# Patient Record
Sex: FEMALE | Race: WHITE | NOT HISPANIC OR LATINO
[De-identification: names, ages, dates, MRNs, and addresses within clinical notes are randomized per-mention and may not be internally consistent; named-entity substitution may affect disease eponyms.]

---

## 2017-05-12 PROBLEM — Z00.00 ENCOUNTER FOR PREVENTIVE HEALTH EXAMINATION: Status: ACTIVE | Noted: 2017-05-12

## 2017-05-26 ENCOUNTER — APPOINTMENT (OUTPATIENT)
Dept: ORTHOPEDIC SURGERY | Facility: CLINIC | Age: 62
End: 2017-05-26

## 2017-05-26 VITALS
BODY MASS INDEX: 29.37 KG/M2 | WEIGHT: 172 LBS | HEIGHT: 64 IN | HEART RATE: 62 BPM | SYSTOLIC BLOOD PRESSURE: 119 MMHG | DIASTOLIC BLOOD PRESSURE: 73 MMHG

## 2017-05-26 DIAGNOSIS — Z82.61 FAMILY HISTORY OF ARTHRITIS: ICD-10-CM

## 2017-05-26 DIAGNOSIS — Z78.9 OTHER SPECIFIED HEALTH STATUS: ICD-10-CM

## 2017-05-26 DIAGNOSIS — Z96.642 PRESENCE OF LEFT ARTIFICIAL HIP JOINT: ICD-10-CM

## 2017-05-26 DIAGNOSIS — Z87.39 PERSONAL HISTORY OF OTHER DISEASES OF THE MUSCULOSKELETAL SYSTEM AND CONNECTIVE TISSUE: ICD-10-CM

## 2017-05-26 DIAGNOSIS — Z80.9 FAMILY HISTORY OF MALIGNANT NEOPLASM, UNSPECIFIED: ICD-10-CM

## 2017-05-26 RX ORDER — ALPRAZOLAM 0.5 MG/1
0.5 TABLET ORAL
Qty: 90 | Refills: 0 | Status: ACTIVE | COMMUNITY
Start: 2017-01-28

## 2017-05-26 RX ORDER — UBIDECARENONE/VIT E ACET 100MG-5
CAPSULE ORAL
Refills: 0 | Status: ACTIVE | COMMUNITY

## 2017-05-26 RX ORDER — AMOXICILLIN 875 MG/1
875 TABLET, FILM COATED ORAL
Qty: 14 | Refills: 0 | Status: ACTIVE | COMMUNITY
Start: 2016-11-29

## 2017-05-26 RX ORDER — LOSARTAN POTASSIUM 100 MG/1
TABLET, FILM COATED ORAL
Refills: 0 | Status: ACTIVE | COMMUNITY

## 2017-05-26 RX ORDER — ALPRAZOLAM 2 MG/1
TABLET ORAL
Refills: 0 | Status: ACTIVE | COMMUNITY

## 2017-05-26 RX ORDER — LOSARTAN POTASSIUM 25 MG/1
25 TABLET, FILM COATED ORAL
Qty: 30 | Refills: 0 | Status: ACTIVE | COMMUNITY
Start: 2017-01-28

## 2017-05-26 RX ORDER — PREDNISONE 10 MG/1
10 TABLET ORAL
Qty: 42 | Refills: 0 | Status: ACTIVE | COMMUNITY
Start: 2017-02-13

## 2017-05-26 RX ORDER — LOSARTAN POTASSIUM 50 MG/1
50 TABLET, FILM COATED ORAL
Qty: 30 | Refills: 0 | Status: ACTIVE | COMMUNITY
Start: 2017-04-22

## 2017-05-30 ENCOUNTER — EMERGENCY (EMERGENCY)
Facility: HOSPITAL | Age: 62
LOS: 1 days | Discharge: HOME | End: 2017-05-30
Attending: EMERGENCY MEDICINE | Admitting: EMERGENCY MEDICINE
Payer: COMMERCIAL

## 2017-05-30 ENCOUNTER — CHART COPY (OUTPATIENT)
Age: 62
End: 2017-05-30

## 2017-05-30 VITALS
TEMPERATURE: 98 F | HEART RATE: 70 BPM | RESPIRATION RATE: 18 BRPM | OXYGEN SATURATION: 100 % | DIASTOLIC BLOOD PRESSURE: 99 MMHG | SYSTOLIC BLOOD PRESSURE: 176 MMHG

## 2017-05-30 DIAGNOSIS — M16.11 UNILATERAL PRIMARY OSTEOARTHRITIS, RIGHT HIP: ICD-10-CM

## 2017-05-30 PROCEDURE — 99285 EMERGENCY DEPT VISIT HI MDM: CPT | Mod: 25

## 2017-05-30 RX ORDER — DICLOFENAC SODIUM 75 MG/1
75 TABLET, DELAYED RELEASE ORAL
Qty: 60 | Refills: 0 | Status: ACTIVE | COMMUNITY
Start: 2017-05-30 | End: 1900-01-01

## 2017-05-30 NOTE — ED ADULT TRIAGE NOTE - CHIEF COMPLAINT QUOTE
Pt c/o right thigh pain since last night. Usually has muscle spasms on both legs but never pain that does not subside. Denies any long trips recently, SOB, swelling or redness at the site. Pain began after she was running errands all day but denies any trauma or fall. PMH of b/l total hip replacement, anxiety, HTN.

## 2017-05-30 NOTE — ED ADULT NURSE NOTE - OBJECTIVE STATEMENT
Pt A+OX4 c/o R thigh pain which began last night and took tylenol without relief.  Came to ER because pain became unbearable.  States Hx of rodney hip replacements and R hip needs revision.  States she was to go for blood work as per MD to determine if metal has migrated into blood.  Rx given for CBCD and Nickel testing.  Able to bear weight and walk but with extreme pain.  Labs obtained and sent as ordered.  #20g SL L arm placed.  Kept NPO.  Elevated BP, Hx HTN, MD notified

## 2017-05-31 VITALS
OXYGEN SATURATION: 98 % | SYSTOLIC BLOOD PRESSURE: 175 MMHG | HEART RATE: 57 BPM | DIASTOLIC BLOOD PRESSURE: 81 MMHG | RESPIRATION RATE: 15 BRPM

## 2017-05-31 DIAGNOSIS — Z96.643 PRESENCE OF ARTIFICIAL HIP JOINT, BILATERAL: Chronic | ICD-10-CM

## 2017-05-31 LAB
ALBUMIN SERPL ELPH-MCNC: 4.8 G/DL — SIGNIFICANT CHANGE UP (ref 3.3–5)
ALP SERPL-CCNC: 49 U/L — SIGNIFICANT CHANGE UP (ref 40–120)
ALT FLD-CCNC: 21 U/L — SIGNIFICANT CHANGE UP (ref 4–33)
AST SERPL-CCNC: 20 U/L — SIGNIFICANT CHANGE UP (ref 4–32)
BASE EXCESS BLDV CALC-SCNC: 1.7 MMOL/L — SIGNIFICANT CHANGE UP
BASOPHILS # BLD AUTO: 0.01 K/UL — SIGNIFICANT CHANGE UP (ref 0–0.2)
BASOPHILS NFR BLD AUTO: 0.1 % — SIGNIFICANT CHANGE UP (ref 0–2)
BILIRUB SERPL-MCNC: 0.2 MG/DL — SIGNIFICANT CHANGE UP (ref 0.2–1.2)
BLOOD GAS VENOUS - CREATININE: 0.67 MG/DL — SIGNIFICANT CHANGE UP (ref 0.5–1.3)
BUN SERPL-MCNC: 12 MG/DL — SIGNIFICANT CHANGE UP (ref 7–23)
CALCIUM SERPL-MCNC: 9.9 MG/DL — SIGNIFICANT CHANGE UP (ref 8.4–10.5)
CHLORIDE BLDV-SCNC: 110 MMOL/L — HIGH (ref 96–108)
CHLORIDE SERPL-SCNC: 102 MMOL/L — SIGNIFICANT CHANGE UP (ref 98–107)
CO2 SERPL-SCNC: 24 MMOL/L — SIGNIFICANT CHANGE UP (ref 22–31)
CREAT SERPL-MCNC: 0.72 MG/DL — SIGNIFICANT CHANGE UP (ref 0.5–1.3)
EOSINOPHIL # BLD AUTO: 0.13 K/UL — SIGNIFICANT CHANGE UP (ref 0–0.5)
EOSINOPHIL NFR BLD AUTO: 1.7 % — SIGNIFICANT CHANGE UP (ref 0–6)
GAS PNL BLDV: 138 MMOL/L — SIGNIFICANT CHANGE UP (ref 136–146)
GLUCOSE BLDV-MCNC: 107 — HIGH (ref 70–99)
GLUCOSE SERPL-MCNC: 104 MG/DL — HIGH (ref 70–99)
HCO3 BLDV-SCNC: 24 MMOL/L — SIGNIFICANT CHANGE UP (ref 20–27)
HCT VFR BLD CALC: 40.8 % — SIGNIFICANT CHANGE UP (ref 34.5–45)
HCT VFR BLDV CALC: 46.9 % — HIGH (ref 34.5–45)
HGB BLD-MCNC: 13.5 G/DL — SIGNIFICANT CHANGE UP (ref 11.5–15.5)
HGB BLDV-MCNC: 15.3 G/DL — SIGNIFICANT CHANGE UP (ref 11.5–15.5)
IMM GRANULOCYTES NFR BLD AUTO: 0.1 % — SIGNIFICANT CHANGE UP (ref 0–1.5)
LACTATE BLDV-MCNC: 1.8 MMOL/L — SIGNIFICANT CHANGE UP (ref 0.5–2)
LYMPHOCYTES # BLD AUTO: 1.83 K/UL — SIGNIFICANT CHANGE UP (ref 1–3.3)
LYMPHOCYTES # BLD AUTO: 23.9 % — SIGNIFICANT CHANGE UP (ref 13–44)
MCHC RBC-ENTMCNC: 29.2 PG — SIGNIFICANT CHANGE UP (ref 27–34)
MCHC RBC-ENTMCNC: 33.1 % — SIGNIFICANT CHANGE UP (ref 32–36)
MCV RBC AUTO: 88.1 FL — SIGNIFICANT CHANGE UP (ref 80–100)
MONOCYTES # BLD AUTO: 0.12 K/UL — SIGNIFICANT CHANGE UP (ref 0–0.9)
MONOCYTES NFR BLD AUTO: 1.6 % — LOW (ref 2–14)
NEUTROPHILS # BLD AUTO: 5.55 K/UL — SIGNIFICANT CHANGE UP (ref 1.8–7.4)
NEUTROPHILS NFR BLD AUTO: 72.6 % — SIGNIFICANT CHANGE UP (ref 43–77)
OB PNL STL: POSITIVE — SIGNIFICANT CHANGE UP
PCO2 BLDV: 50 MMHG — SIGNIFICANT CHANGE UP (ref 41–51)
PH BLDV: 7.35 PH — SIGNIFICANT CHANGE UP (ref 7.32–7.43)
PLATELET # BLD AUTO: 342 K/UL — SIGNIFICANT CHANGE UP (ref 150–400)
PMV BLD: 9.9 FL — SIGNIFICANT CHANGE UP (ref 7–13)
PO2 BLDV: 38 MMHG — SIGNIFICANT CHANGE UP (ref 35–40)
POTASSIUM BLDV-SCNC: 3.8 MMOL/L — SIGNIFICANT CHANGE UP (ref 3.4–4.5)
POTASSIUM SERPL-MCNC: 4.1 MMOL/L — SIGNIFICANT CHANGE UP (ref 3.5–5.3)
POTASSIUM SERPL-SCNC: 4.1 MMOL/L — SIGNIFICANT CHANGE UP (ref 3.5–5.3)
PROT SERPL-MCNC: 8.1 G/DL — SIGNIFICANT CHANGE UP (ref 6–8.3)
RBC # BLD: 4.63 M/UL — SIGNIFICANT CHANGE UP (ref 3.8–5.2)
RBC # FLD: 13.1 % — SIGNIFICANT CHANGE UP (ref 10.3–14.5)
SAO2 % BLDV: 64.7 % — SIGNIFICANT CHANGE UP (ref 60–85)
SODIUM SERPL-SCNC: 142 MMOL/L — SIGNIFICANT CHANGE UP (ref 135–145)
WBC # BLD: 7.65 K/UL — SIGNIFICANT CHANGE UP (ref 3.8–10.5)
WBC # FLD AUTO: 7.65 K/UL — SIGNIFICANT CHANGE UP (ref 3.8–10.5)

## 2017-05-31 RX ORDER — KETOROLAC TROMETHAMINE 30 MG/ML
15 SYRINGE (ML) INJECTION ONCE
Qty: 0 | Refills: 0 | Status: DISCONTINUED | OUTPATIENT
Start: 2017-05-31 | End: 2017-05-31

## 2017-05-31 RX ORDER — ACETAMINOPHEN 500 MG
1000 TABLET ORAL ONCE
Qty: 0 | Refills: 0 | Status: COMPLETED | OUTPATIENT
Start: 2017-05-31 | End: 2017-05-31

## 2017-05-31 RX ADMIN — Medication 15 MILLIGRAM(S): at 00:53

## 2017-05-31 RX ADMIN — Medication 15 MILLIGRAM(S): at 00:38

## 2017-05-31 RX ADMIN — Medication 400 MILLIGRAM(S): at 01:21

## 2017-05-31 RX ADMIN — Medication 30 MILLILITER(S): at 02:43

## 2017-05-31 NOTE — ED PROVIDER NOTE - TIMING
intermittent/sudden onset/intermittent worsening (today s/p performing activities) right anterior thigh pain/worsening

## 2017-05-31 NOTE — ED PROVIDER NOTE - OBJECTIVE STATEMENT
60 y/o F pt with no sig PMHx and PSHx of b/l Hip replacement (left hip was 8 years ago and right hip was 20 years ago) arrives to the ED c/o intermittent worsening (today s/p performing activities) right anterior thigh pain for 3 months and one bloody stool episode earlier today. Pt reports that she normally has muscle spasms to both legs but her Orthopedic recently noticed that her b/l hips needed revision; XR then done last week to confirm this. Tylenol for minimal relief. Denies fever, chills, N/V/D, SOB, CP, numbness, weakness, hip pain, knee pain or any other complaints. Allergic to Morphine.

## 2017-05-31 NOTE — ED PROVIDER NOTE - CHPI ED SYMPTOMS POS
PAIN/intermittent worsening (today s/p performing activities) right anterior thigh pain and one bloody stool episode

## 2017-05-31 NOTE — ED PROVIDER NOTE - MEDICAL DECISION MAKING DETAILS
60 y/o F pt with right anterior thigh pain. Likely Musculoskeletal. With recent XR, will obtain labs, muscles relaxants, and anti-inflammatories. Reassess.

## 2017-05-31 NOTE — ED PROVIDER NOTE - NS ED MD SCRIBE ATTENDING SCRIBE SECTIONS
PAST MEDICAL/SURGICAL/SOCIAL HISTORY/PHYSICAL EXAM/REVIEW OF SYSTEMS/VITAL SIGNS( Pullset)/HISTORY OF PRESENT ILLNESS/DISPOSITION/HIV

## 2017-05-31 NOTE — ED ADULT NURSE REASSESSMENT NOTE - NS ED NURSE REASSESS COMMENT FT1
Patient is complaining of 10/10 pain with no relief from previous meds.  Vitals taken, pain meds given per MD orders and will reassess pain.

## 2017-05-31 NOTE — ED PROVIDER NOTE - PROGRESS NOTE DETAILS
Pt states feeling better. Ambulating without any difficulty. Discussed lab results and given GI f/u. Discussed f/u with Ortho as planned. Pt was prescribed Diclofenac which she has not yet started. Sent Percocet to pharmacy which helped here and no GI upset when taken with Maalox prior.

## 2017-05-31 NOTE — ED PROVIDER NOTE - MUSCULOSKELETAL MINIMAL EXAM
normal range of motion/no hip TTP, no knee (b/l) TTP, no hip swelling, and no calf swelling  and positive distal pulses. Also no midline TTP/no muscle tenderness/neck supple/atraumatic

## 2017-06-01 ENCOUNTER — CHART COPY (OUTPATIENT)
Age: 62
End: 2017-06-01

## 2017-06-02 ENCOUNTER — APPOINTMENT (OUTPATIENT)
Dept: ORTHOPEDIC SURGERY | Facility: CLINIC | Age: 62
End: 2017-06-02

## 2017-06-02 VITALS
DIASTOLIC BLOOD PRESSURE: 78 MMHG | SYSTOLIC BLOOD PRESSURE: 168 MMHG | WEIGHT: 172 LBS | HEIGHT: 64 IN | BODY MASS INDEX: 29.37 KG/M2 | HEART RATE: 62 BPM

## 2017-06-02 DIAGNOSIS — M25.551 PAIN IN RIGHT HIP: ICD-10-CM

## 2017-06-02 RX ORDER — OXYCODONE AND ACETAMINOPHEN 5; 325 MG/1; MG/1
5-325 TABLET ORAL
Qty: 20 | Refills: 0 | Status: ACTIVE | COMMUNITY
Start: 2017-06-02 | End: 1900-01-01

## 2017-06-02 RX ORDER — FAMOTIDINE 20 MG/1
20 TABLET, FILM COATED ORAL
Qty: 14 | Refills: 0 | Status: ACTIVE | COMMUNITY
Start: 2017-06-02 | End: 1900-01-01

## 2018-01-23 ENCOUNTER — OUTPATIENT (OUTPATIENT)
Dept: OUTPATIENT SERVICES | Facility: HOSPITAL | Age: 63
LOS: 1 days | End: 2018-01-23
Payer: COMMERCIAL

## 2018-01-23 VITALS
RESPIRATION RATE: 16 BRPM | HEIGHT: 64 IN | HEART RATE: 65 BPM | SYSTOLIC BLOOD PRESSURE: 131 MMHG | OXYGEN SATURATION: 98 % | DIASTOLIC BLOOD PRESSURE: 79 MMHG | TEMPERATURE: 98 F | WEIGHT: 175.05 LBS

## 2018-01-23 DIAGNOSIS — S43.421A SPRAIN OF RIGHT ROTATOR CUFF CAPSULE, INITIAL ENCOUNTER: ICD-10-CM

## 2018-01-23 DIAGNOSIS — Z96.643 PRESENCE OF ARTIFICIAL HIP JOINT, BILATERAL: Chronic | ICD-10-CM

## 2018-01-23 DIAGNOSIS — M75.01 ADHESIVE CAPSULITIS OF RIGHT SHOULDER: ICD-10-CM

## 2018-01-23 DIAGNOSIS — M75.21 BICIPITAL TENDINITIS, RIGHT SHOULDER: ICD-10-CM

## 2018-01-23 DIAGNOSIS — M75.51 BURSITIS OF RIGHT SHOULDER: ICD-10-CM

## 2018-01-23 DIAGNOSIS — F41.0 PANIC DISORDER [EPISODIC PAROXYSMAL ANXIETY]: ICD-10-CM

## 2018-01-23 DIAGNOSIS — Z01.818 ENCOUNTER FOR OTHER PREPROCEDURAL EXAMINATION: ICD-10-CM

## 2018-01-23 DIAGNOSIS — I10 ESSENTIAL (PRIMARY) HYPERTENSION: ICD-10-CM

## 2018-01-23 DIAGNOSIS — Z98.890 OTHER SPECIFIED POSTPROCEDURAL STATES: Chronic | ICD-10-CM

## 2018-01-23 LAB
ANION GAP SERPL CALC-SCNC: 19 MMOL/L — HIGH (ref 5–17)
BUN SERPL-MCNC: 16 MG/DL — SIGNIFICANT CHANGE UP (ref 7–23)
CALCIUM SERPL-MCNC: 10.5 MG/DL — SIGNIFICANT CHANGE UP (ref 8.4–10.5)
CHLORIDE SERPL-SCNC: 100 MMOL/L — SIGNIFICANT CHANGE UP (ref 96–108)
CO2 SERPL-SCNC: 24 MMOL/L — SIGNIFICANT CHANGE UP (ref 22–31)
CREAT SERPL-MCNC: 0.74 MG/DL — SIGNIFICANT CHANGE UP (ref 0.5–1.3)
GLUCOSE SERPL-MCNC: 97 MG/DL — SIGNIFICANT CHANGE UP (ref 70–99)
HCT VFR BLD CALC: 40.6 % — SIGNIFICANT CHANGE UP (ref 34.5–45)
HGB BLD-MCNC: 13.3 G/DL — SIGNIFICANT CHANGE UP (ref 11.5–15.5)
MCHC RBC-ENTMCNC: 28.6 PG — SIGNIFICANT CHANGE UP (ref 27–34)
MCHC RBC-ENTMCNC: 32.8 GM/DL — SIGNIFICANT CHANGE UP (ref 32–36)
MCV RBC AUTO: 87.3 FL — SIGNIFICANT CHANGE UP (ref 80–100)
PLATELET # BLD AUTO: 407 K/UL — HIGH (ref 150–400)
POTASSIUM SERPL-MCNC: 4.3 MMOL/L — SIGNIFICANT CHANGE UP (ref 3.5–5.3)
POTASSIUM SERPL-SCNC: 4.3 MMOL/L — SIGNIFICANT CHANGE UP (ref 3.5–5.3)
RBC # BLD: 4.65 M/UL — SIGNIFICANT CHANGE UP (ref 3.8–5.2)
RBC # FLD: 13.6 % — SIGNIFICANT CHANGE UP (ref 10.3–14.5)
SODIUM SERPL-SCNC: 143 MMOL/L — SIGNIFICANT CHANGE UP (ref 135–145)
WBC # BLD: 6.11 K/UL — SIGNIFICANT CHANGE UP (ref 3.8–10.5)
WBC # FLD AUTO: 6.11 K/UL — SIGNIFICANT CHANGE UP (ref 3.8–10.5)

## 2018-01-23 PROCEDURE — 85027 COMPLETE CBC AUTOMATED: CPT

## 2018-01-23 PROCEDURE — 80048 BASIC METABOLIC PNL TOTAL CA: CPT

## 2018-01-23 PROCEDURE — G0463: CPT

## 2018-01-23 RX ORDER — APREPITANT 80 MG/1
40 CAPSULE ORAL ONCE
Qty: 0 | Refills: 0 | Status: COMPLETED | OUTPATIENT
Start: 2018-01-29 | End: 2018-01-29

## 2018-01-23 RX ORDER — LIDOCAINE HCL 20 MG/ML
0.2 VIAL (ML) INJECTION ONCE
Qty: 0 | Refills: 0 | Status: DISCONTINUED | OUTPATIENT
Start: 2018-01-29 | End: 2018-02-13

## 2018-01-23 RX ORDER — SODIUM CHLORIDE 9 MG/ML
3 INJECTION INTRAMUSCULAR; INTRAVENOUS; SUBCUTANEOUS EVERY 8 HOURS
Qty: 0 | Refills: 0 | Status: DISCONTINUED | OUTPATIENT
Start: 2018-01-29 | End: 2018-02-13

## 2018-01-23 RX ORDER — CEFAZOLIN SODIUM 1 G
2000 VIAL (EA) INJECTION ONCE
Qty: 0 | Refills: 0 | Status: DISCONTINUED | OUTPATIENT
Start: 2018-01-29 | End: 2018-02-13

## 2018-01-23 NOTE — H&P PST ADULT - HISTORY OF PRESENT ILLNESS
63 y/o female for 63 y/o female with history of hypertension panic disorder and osteoarthrosis with hip replacement bilateral . Patient came in for worsening shouldwer pain that gotten worst recently. patient was referred to dr Castillo , evaluated and was scheduled this procedure for surgical intervention. 61 y/o female with history of hypertension panic disorder and osteoarthrosis with hip replacement bilateral . Patient came in for PST  for right shoulder rotator cuff repair , Patient has history of worsening shoulder pain that gotten worst recently.  Patient  was referred to Dr Castillo , evaluated and was scheduled this procedure for surgical intervention.

## 2018-01-23 NOTE — H&P PST ADULT - PMH
Hypertension  on meds  Osteoarthrosis  s/p bilateral hip replacements  Panic attacks  since age 40 y/o on xanax

## 2018-01-23 NOTE — H&P PST ADULT - GENERAL COMMENTS
c/o shoulder pains worst x 1 month torned rotator cuff c/o shoulder pains worst x 1 month states has  torn rotator cuff

## 2018-01-23 NOTE — H&P PST ADULT - PROBLEM SELECTOR PLAN 1
right shoulder rotator cuff repair   right shoulder debridement right subacromial  decompression right open subpectoral bicep tendesis   PSt instruction given with Pepcid 1 tablet x 2 days preop and antibacterial soap x 3 days preop for prophylaxis for prevention of infection post op   CBC/BMP drawn sent pending result    seen by PMD yesterday and will send preop medical evaluation  preop right shoulder rotator cuff repair   right shoulder debridement right subacromial  decompression right open subpectoral bicep tendesis   PSt instruction given with Pepcid 1 tablet x 2 days preop and antibacterial soap x 3 days preop for prophylaxis for prevention of infection post op   CBC/BMP drawn sent pending result    seen by PMD  Dr Corral yesterday and will send preop medical evaluation  preop Hx of Torn rotator cuff   Plan: right shoulder rotator cuff repair   right shoulder debridement right subacromial  decompression right open subpectoral bicep tendesis   PSt instruction given with Pepcid 1 tablet x 2 days preop and antibacterial soap x 3 days preop for prophylaxis for prevention of infection post op   CBC/BMP drawn sent pending result    seen by PMD  Dr Corral yesterday and will send preop medical evaluation  preop

## 2018-01-23 NOTE — H&P PST ADULT - PROBLEM SELECTOR PLAN 3
to continue taking amlodipine and Hydrochlorothiazide,  regularly as prescribed and to hold water pill on DOS

## 2018-01-29 ENCOUNTER — OUTPATIENT (OUTPATIENT)
Dept: OUTPATIENT SERVICES | Facility: HOSPITAL | Age: 63
LOS: 1 days | End: 2018-01-29
Payer: COMMERCIAL

## 2018-01-29 ENCOUNTER — TRANSCRIPTION ENCOUNTER (OUTPATIENT)
Age: 63
End: 2018-01-29

## 2018-01-29 VITALS
OXYGEN SATURATION: 97 % | TEMPERATURE: 98 F | SYSTOLIC BLOOD PRESSURE: 120 MMHG | HEART RATE: 65 BPM | DIASTOLIC BLOOD PRESSURE: 55 MMHG | RESPIRATION RATE: 16 BRPM

## 2018-01-29 VITALS
HEIGHT: 64 IN | HEART RATE: 65 BPM | TEMPERATURE: 98 F | RESPIRATION RATE: 16 BRPM | OXYGEN SATURATION: 98 % | SYSTOLIC BLOOD PRESSURE: 131 MMHG | DIASTOLIC BLOOD PRESSURE: 79 MMHG | WEIGHT: 175.05 LBS

## 2018-01-29 DIAGNOSIS — M75.51 BURSITIS OF RIGHT SHOULDER: ICD-10-CM

## 2018-01-29 DIAGNOSIS — S43.421A SPRAIN OF RIGHT ROTATOR CUFF CAPSULE, INITIAL ENCOUNTER: ICD-10-CM

## 2018-01-29 DIAGNOSIS — M75.01 ADHESIVE CAPSULITIS OF RIGHT SHOULDER: ICD-10-CM

## 2018-01-29 DIAGNOSIS — Z96.643 PRESENCE OF ARTIFICIAL HIP JOINT, BILATERAL: Chronic | ICD-10-CM

## 2018-01-29 DIAGNOSIS — M75.21 BICIPITAL TENDINITIS, RIGHT SHOULDER: ICD-10-CM

## 2018-01-29 DIAGNOSIS — Z98.890 OTHER SPECIFIED POSTPROCEDURAL STATES: Chronic | ICD-10-CM

## 2018-01-29 PROCEDURE — 29826 SHO ARTHRS SRG DECOMPRESSION: CPT | Mod: RT

## 2018-01-29 PROCEDURE — 23430 REPAIR BICEPS TENDON: CPT | Mod: RT

## 2018-01-29 PROCEDURE — C1713: CPT

## 2018-01-29 PROCEDURE — 29827 SHO ARTHRS SRG RT8TR CUF RPR: CPT | Mod: RT

## 2018-01-29 RX ORDER — ONDANSETRON 8 MG/1
4 TABLET, FILM COATED ORAL ONCE
Qty: 0 | Refills: 0 | Status: DISCONTINUED | OUTPATIENT
Start: 2018-01-29 | End: 2018-02-13

## 2018-01-29 RX ORDER — HYDROMORPHONE HYDROCHLORIDE 2 MG/ML
0.25 INJECTION INTRAMUSCULAR; INTRAVENOUS; SUBCUTANEOUS
Qty: 0 | Refills: 0 | Status: DISCONTINUED | OUTPATIENT
Start: 2018-01-29 | End: 2018-01-29

## 2018-01-29 RX ORDER — ALPRAZOLAM 0.25 MG
0 TABLET ORAL
Qty: 0 | Refills: 0 | COMMUNITY

## 2018-01-29 RX ORDER — OXYCODONE HYDROCHLORIDE 5 MG/1
5 TABLET ORAL ONCE
Qty: 0 | Refills: 0 | Status: DISCONTINUED | OUTPATIENT
Start: 2018-01-29 | End: 2018-01-29

## 2018-01-29 RX ORDER — CELECOXIB 200 MG/1
200 CAPSULE ORAL ONCE
Qty: 0 | Refills: 0 | Status: DISCONTINUED | OUTPATIENT
Start: 2018-01-29 | End: 2018-02-13

## 2018-01-29 RX ORDER — SODIUM CHLORIDE 9 MG/ML
1000 INJECTION, SOLUTION INTRAVENOUS
Qty: 0 | Refills: 0 | Status: DISCONTINUED | OUTPATIENT
Start: 2018-01-29 | End: 2018-02-13

## 2018-01-29 RX ORDER — AMLODIPINE BESYLATE 2.5 MG/1
1 TABLET ORAL
Qty: 0 | Refills: 0 | COMMUNITY

## 2018-01-29 RX ADMIN — APREPITANT 40 MILLIGRAM(S): 80 CAPSULE ORAL at 10:30

## 2018-01-29 NOTE — ASU PATIENT PROFILE, ADULT - PMH
Hypertension  on meds  Osteoarthrosis  s/p bilateral hip replacements  Panic attacks  since age 38 y/o on xanax

## 2018-01-29 NOTE — PRE-ANESTHESIA EVALUATION ADULT - NSPROPOSEDPROCEDFT_GEN_ALL_CORE
right shoulder rotator cuff repair, right shoulder debridment, right subacromial debridment, right subacromial decompression, right open subpectoral biceps tendesis

## 2018-01-29 NOTE — PRE-ANESTHESIA EVALUATION ADULT - NSPREOPDXFT_GEN_ALL_CORE
sprain of right rotator cuff, adhesive capsulitis right shoulder bursitis right shoulder bicipital tendinitis right shoulder

## 2018-01-29 NOTE — ASU DISCHARGE PLAN (ADULT/PEDIATRIC). - NOTIFY
Pain not relieved by Medications/Numbness, color, or temperature change to extremity/Persistent Nausea and Vomiting/Swelling that continues/Bleeding that does not stop Persistent Nausea and Vomiting/Numbness, color, or temperature change to extremity/Bleeding that does not stop/Swelling that continues/Inability to Tolerate Liquids or Foods/Pain not relieved by Medications/Unable to Urinate

## 2018-01-29 NOTE — ASU DISCHARGE PLAN (ADULT/PEDIATRIC). - MEDICATION SUMMARY - MEDICATIONS TO TAKE
I will START or STAY ON the medications listed below when I get home from the hospital:    megared  -- 1   once a day  -- Indication: For home med    Vicodin 5 mg-300 mg oral tablet  -- 1-2 tab(s) by mouth every 4 hours, As Needed MDD:8 tabs  -- Caution federal law prohibits the transfer of this drug to any person other  than the person for whom it was prescribed.  Do not drink alcoholic beverages when taking this medication.  May cause drowsiness.  Alcohol may intensify this effect.  Use care when operating dangerous machinery.  This drug may impair the ability to drive or operate machinery.  Use care until you become familiar with its effects.  This product contains acetaminophen.  Do not use  with any other product containing acetaminophen to prevent possible liver damage.  Using more of this medication than prescribed may cause serious breathing problems.    -- Indication: For pain    Xanax 0.5 mg oral tablet  -- orally once a day  -- Indication: For home med    amLODIPine 5 mg oral tablet  -- 1 tab(s) by mouth once a day  -- Indication: For home med    hydroCHLOROthiazide 12.5 mg oral capsule  -- 1 cap(s) by mouth once a day  -- Indication: For home med

## 2018-01-29 NOTE — ASU DISCHARGE PLAN (ADULT/PEDIATRIC). - NURSING INSTRUCTIONS
stay home for next 24 hrs,  no driving,no alcohol,no smoking for 24 hrs, stay home for next 24 hrs.  eat lite to start and then resume your diet. call your Dr for f/u appointment.f/u with your drs printed instructions, given.

## 2018-01-29 NOTE — BRIEF OPERATIVE NOTE - PROCEDURE
<<-----Click on this checkbox to enter Procedure Rotator cuff repair  01/29/2018  right with biceps tenodesis  Active  MINDYL

## 2018-09-28 ENCOUNTER — RESULT REVIEW (OUTPATIENT)
Age: 63
End: 2018-09-28

## 2019-10-28 PROBLEM — Z00.00 ENCOUNTER FOR PREVENTIVE HEALTH EXAMINATION: Status: ACTIVE | Noted: 2019-10-28

## 2019-10-29 ENCOUNTER — APPOINTMENT (OUTPATIENT)
Dept: OBGYN | Facility: CLINIC | Age: 64
End: 2019-10-29
Payer: COMMERCIAL

## 2019-10-29 VITALS
WEIGHT: 170 LBS | BODY MASS INDEX: 31.28 KG/M2 | DIASTOLIC BLOOD PRESSURE: 84 MMHG | SYSTOLIC BLOOD PRESSURE: 140 MMHG | HEIGHT: 62 IN

## 2019-10-29 DIAGNOSIS — I10 ESSENTIAL (PRIMARY) HYPERTENSION: ICD-10-CM

## 2019-10-29 DIAGNOSIS — Z87.891 PERSONAL HISTORY OF NICOTINE DEPENDENCE: ICD-10-CM

## 2019-10-29 PROCEDURE — 99387 INIT PM E/M NEW PAT 65+ YRS: CPT

## 2019-10-29 RX ORDER — METOPROLOL TARTRATE 75 MG/1
TABLET, FILM COATED ORAL
Refills: 0 | Status: ACTIVE | COMMUNITY

## 2019-10-29 RX ORDER — HYDROCHLOROTHIAZIDE 12.5 MG/1
TABLET ORAL
Refills: 0 | Status: ACTIVE | COMMUNITY

## 2019-10-29 RX ORDER — ALPRAZOLAM 2 MG/1
TABLET ORAL
Refills: 0 | Status: ACTIVE | COMMUNITY

## 2019-10-30 ENCOUNTER — APPOINTMENT (OUTPATIENT)
Dept: OBGYN | Facility: CLINIC | Age: 64
End: 2019-10-30
Payer: COMMERCIAL

## 2019-10-30 ENCOUNTER — ASOB RESULT (OUTPATIENT)
Age: 64
End: 2019-10-30

## 2019-10-30 PROCEDURE — 76830 TRANSVAGINAL US NON-OB: CPT

## 2019-11-04 LAB — CYTOLOGY CVX/VAG DOC THIN PREP: ABNORMAL

## 2019-11-05 RX ORDER — AMLODIPINE BESYLATE AND BENAZEPRIL HYDROCHLORIDE 2.5; 1 MG/1; MG/1
2.5-1 CAPSULE ORAL
Refills: 0 | Status: ACTIVE | COMMUNITY
Start: 2019-11-05

## 2020-07-10 ENCOUNTER — APPOINTMENT (OUTPATIENT)
Dept: OBGYN | Facility: CLINIC | Age: 65
End: 2020-07-10
Payer: SELF-PAY

## 2020-07-10 VITALS — BODY MASS INDEX: 31.09 KG/M2 | DIASTOLIC BLOOD PRESSURE: 82 MMHG | SYSTOLIC BLOOD PRESSURE: 155 MMHG | WEIGHT: 170 LBS

## 2020-07-10 DIAGNOSIS — N60.01 SOLITARY CYST OF RIGHT BREAST: ICD-10-CM

## 2020-07-10 DIAGNOSIS — N64.4 MASTODYNIA: ICD-10-CM

## 2020-07-10 DIAGNOSIS — N60.02 SOLITARY CYST OF RIGHT BREAST: ICD-10-CM

## 2020-07-10 DIAGNOSIS — R92.1 MAMMOGRAPHIC CALCIFICATION FOUND ON DIAGNOSTIC IMAGING OF BREAST: ICD-10-CM

## 2020-07-10 PROCEDURE — 99213 OFFICE O/P EST LOW 20 MIN: CPT

## 2022-04-19 NOTE — ASU PATIENT PROFILE, ADULT - VISION (WITH CORRECTIVE LENSES IF THE PATIENT USUALLY WEARS THEM):
Normal vision: sees adequately in most situations; can see medication labels, newsprint Double O-Z Plasty Text: The defect edges were debeveled with a #15 scalpel blade.  Given the location of the defect, shape of the defect and the proximity to free margins a Double O-Z plasty (double transposition flap) was deemed most appropriate.  Using a sterile surgical marker, the appropriate transposition flaps were drawn incorporating the defect and placing the expected incisions within the relaxed skin tension lines where possible. The area thus outlined was incised deep to adipose tissue with a #15 scalpel blade.  The skin margins were undermined to an appropriate distance in all directions utilizing iris scissors.  Hemostasis was achieved with electrocautery.  The flaps were then transposed into place, one clockwise and the other counterclockwise, and anchored with interrupted buried subcutaneous sutures.

## 2023-05-04 PROBLEM — M19.90 UNSPECIFIED OSTEOARTHRITIS, UNSPECIFIED SITE: Chronic | Status: ACTIVE | Noted: 2018-01-23

## 2023-05-04 PROBLEM — I10 ESSENTIAL (PRIMARY) HYPERTENSION: Chronic | Status: ACTIVE | Noted: 2018-01-23

## 2023-05-04 PROBLEM — F41.0 PANIC DISORDER [EPISODIC PAROXYSMAL ANXIETY]: Chronic | Status: ACTIVE | Noted: 2018-01-23

## 2023-05-05 ENCOUNTER — APPOINTMENT (OUTPATIENT)
Dept: ORTHOPEDIC SURGERY | Facility: CLINIC | Age: 68
End: 2023-05-05
Payer: MEDICARE

## 2023-05-05 VITALS — WEIGHT: 172 LBS | BODY MASS INDEX: 29.37 KG/M2 | HEIGHT: 64 IN

## 2023-05-05 DIAGNOSIS — Z96.641 PRESENCE OF RIGHT ARTIFICIAL HIP JOINT: ICD-10-CM

## 2023-05-05 DIAGNOSIS — S73.101A UNSPECIFIED SPRAIN OF RIGHT HIP, INITIAL ENCOUNTER: ICD-10-CM

## 2023-05-05 PROCEDURE — 99203 OFFICE O/P NEW LOW 30 MIN: CPT

## 2023-05-05 PROCEDURE — 73503 X-RAY EXAM HIP UNI 4/> VIEWS: CPT | Mod: RT

## 2023-05-05 RX ORDER — METHYLPREDNISOLONE 4 MG/1
4 TABLET ORAL
Qty: 1 | Refills: 1 | Status: ACTIVE | COMMUNITY
Start: 2023-05-05 | End: 1900-01-01

## 2023-05-05 NOTE — IMAGING
[de-identified] : Thoracic / Lumbar- skin change / bony deformity:\par Gait examination: minimally antalgic to the right.\par Pt is able to heel and toe walk.\par Shopping Cart Sign: negative\par TTP: over the right hip adductors and mild weakness is noted in this plane.\par \par Percussion of T/L spine: negative\par ROM: is full and pain free\par Facet Joint Compression Tests: negative\par Sacroiliac joints: are nttp\par Bilateral hip ROM: is full and pain free.\par Sensation: normal symmetrically\par DTRs: normal symmetrically\par Clonus: negative\par Babinski Test: are negative\par Seated SLR Tests are negative.\par \par  [FreeTextEntry9] : bilateral hips with no acute injury. right hip ball is eccentric indicating wear.

## 2023-05-05 NOTE — ASSESSMENT
[FreeTextEntry1] : The patient was advised of the diagnosis. The natural history of the pathology was explained in full to the patient in layman's terms. All questions were answered. The risks and benefits of surgical and non-surgical treatment alternatives were explained in full to the patient.\par \par \par Pt provided MDP.\par Will f/u with Dr. Montero in 1-2 weeks.

## 2023-05-05 NOTE — HISTORY OF PRESENT ILLNESS
[Right Leg] : right leg [10] : 10 [Burning] : burning [Dull/Aching] : dull/aching [Localized] : localized [Constant] : constant [Standing] : standing [Walking] : walking [Stairs] : stairs [Full time] : Work status: full time [de-identified] : pt here for evaluation of right hip pain s/p slip and fall on her steps , causing a hyperflexion injury. \par Pt recently took  a medrol dose pack which provided significant pain relief.\par Pt also complains of chronic bilateral foot tingling and she informed me that she has hx of "high sugars".\par Pt denies lumbar pain.\par Pt states she has mild weakness with right hip flexion and pt states she has hx of bilateral THAs. \par \par Allergies: NKDA.  [] : Post Surgical Visit: no [FreeTextEntry5] : Patient complains of pain in her Rt thigh for years, and then she had a fall on 4/25/23 at home and pain got worse\par complains of burning pain in both feet at night

## 2023-05-08 ENCOUNTER — APPOINTMENT (OUTPATIENT)
Dept: ORTHOPEDIC SURGERY | Facility: CLINIC | Age: 68
End: 2023-05-08

## 2024-11-14 ENCOUNTER — APPOINTMENT (OUTPATIENT)
Dept: SURGERY | Facility: CLINIC | Age: 69
End: 2024-11-14
Payer: MEDICARE

## 2024-11-14 VITALS
OXYGEN SATURATION: 98 % | HEART RATE: 80 BPM | HEIGHT: 64 IN | SYSTOLIC BLOOD PRESSURE: 134 MMHG | WEIGHT: 154 LBS | DIASTOLIC BLOOD PRESSURE: 81 MMHG | BODY MASS INDEX: 26.29 KG/M2

## 2024-11-14 DIAGNOSIS — F41.9 ANXIETY DISORDER, UNSPECIFIED: ICD-10-CM

## 2024-11-14 DIAGNOSIS — F32.A ANXIETY DISORDER, UNSPECIFIED: ICD-10-CM

## 2024-11-14 PROBLEM — K82.8 BILIARY DYSKINESIA: Status: ACTIVE | Noted: 2024-11-14

## 2024-11-14 PROBLEM — I72.2 RENAL ARTERY ANEURYSM: Status: ACTIVE | Noted: 2024-11-14

## 2024-11-14 PROCEDURE — 99202 OFFICE O/P NEW SF 15 MIN: CPT

## 2024-11-14 RX ORDER — DULOXETINE HYDROCHLORIDE 30 MG/1
30 CAPSULE, DELAYED RELEASE ORAL
Refills: 0 | Status: ACTIVE | COMMUNITY

## 2024-11-19 ENCOUNTER — APPOINTMENT (OUTPATIENT)
Dept: VASCULAR SURGERY | Facility: CLINIC | Age: 69
End: 2024-11-19
Payer: MEDICARE

## 2024-11-19 VITALS
HEIGHT: 62 IN | WEIGHT: 154 LBS | DIASTOLIC BLOOD PRESSURE: 83 MMHG | OXYGEN SATURATION: 96 % | BODY MASS INDEX: 28.34 KG/M2 | HEART RATE: 68 BPM | SYSTOLIC BLOOD PRESSURE: 125 MMHG

## 2024-11-19 DIAGNOSIS — I72.2 ANEURYSM OF RENAL ARTERY: ICD-10-CM

## 2024-11-19 PROCEDURE — 99203 OFFICE O/P NEW LOW 30 MIN: CPT

## 2024-11-21 ENCOUNTER — APPOINTMENT (OUTPATIENT)
Dept: SURGERY | Facility: CLINIC | Age: 69
End: 2024-11-21
Payer: MEDICARE

## 2024-11-21 DIAGNOSIS — G89.29 RIGHT UPPER QUADRANT PAIN: ICD-10-CM

## 2024-11-21 DIAGNOSIS — R10.11 RIGHT UPPER QUADRANT PAIN: ICD-10-CM

## 2024-11-21 DIAGNOSIS — K82.8 OTHER SPECIFIED DISEASES OF GALLBLADDER: ICD-10-CM

## 2024-11-21 PROCEDURE — 99214 OFFICE O/P EST MOD 30 MIN: CPT | Mod: 57

## 2024-11-21 RX ORDER — DICYCLOMINE HYDROCHLORIDE 20 MG/1
20 TABLET ORAL
Qty: 90 | Refills: 0 | Status: ACTIVE | COMMUNITY
Start: 2024-11-21 | End: 1900-01-01

## 2024-12-04 ENCOUNTER — OUTPATIENT (OUTPATIENT)
Dept: OUTPATIENT SERVICES | Facility: HOSPITAL | Age: 69
LOS: 1 days | End: 2024-12-04
Payer: MEDICARE

## 2024-12-04 VITALS
TEMPERATURE: 98 F | HEIGHT: 61.5 IN | OXYGEN SATURATION: 96 % | RESPIRATION RATE: 16 BRPM | DIASTOLIC BLOOD PRESSURE: 77 MMHG | SYSTOLIC BLOOD PRESSURE: 135 MMHG | HEART RATE: 81 BPM | WEIGHT: 153 LBS

## 2024-12-04 DIAGNOSIS — Z01.818 ENCOUNTER FOR OTHER PREPROCEDURAL EXAMINATION: ICD-10-CM

## 2024-12-04 DIAGNOSIS — K82.8 OTHER SPECIFIED DISEASES OF GALLBLADDER: ICD-10-CM

## 2024-12-04 DIAGNOSIS — Z98.890 OTHER SPECIFIED POSTPROCEDURAL STATES: Chronic | ICD-10-CM

## 2024-12-04 DIAGNOSIS — Z96.643 PRESENCE OF ARTIFICIAL HIP JOINT, BILATERAL: Chronic | ICD-10-CM

## 2024-12-04 DIAGNOSIS — Z98.1 ARTHRODESIS STATUS: Chronic | ICD-10-CM

## 2024-12-04 LAB
ALBUMIN SERPL ELPH-MCNC: 4.4 G/DL — SIGNIFICANT CHANGE UP (ref 3.3–5)
ALP SERPL-CCNC: 74 U/L — SIGNIFICANT CHANGE UP (ref 40–120)
ALT FLD-CCNC: 27 U/L — SIGNIFICANT CHANGE UP (ref 12–78)
ANION GAP SERPL CALC-SCNC: 3 MMOL/L — LOW (ref 5–17)
AST SERPL-CCNC: 11 U/L — LOW (ref 15–37)
BILIRUB SERPL-MCNC: 0.3 MG/DL — SIGNIFICANT CHANGE UP (ref 0.2–1.2)
BUN SERPL-MCNC: 17 MG/DL — SIGNIFICANT CHANGE UP (ref 7–23)
CALCIUM SERPL-MCNC: 10.3 MG/DL — HIGH (ref 8.5–10.1)
CHLORIDE SERPL-SCNC: 107 MMOL/L — SIGNIFICANT CHANGE UP (ref 96–108)
CO2 SERPL-SCNC: 28 MMOL/L — SIGNIFICANT CHANGE UP (ref 22–31)
CREAT SERPL-MCNC: 0.96 MG/DL — SIGNIFICANT CHANGE UP (ref 0.5–1.3)
EGFR: 64 ML/MIN/1.73M2 — SIGNIFICANT CHANGE UP
GLUCOSE SERPL-MCNC: 169 MG/DL — HIGH (ref 70–99)
HCT VFR BLD CALC: 41.5 % — SIGNIFICANT CHANGE UP (ref 34.5–45)
HGB BLD-MCNC: 14 G/DL — SIGNIFICANT CHANGE UP (ref 11.5–15.5)
MCHC RBC-ENTMCNC: 29 PG — SIGNIFICANT CHANGE UP (ref 27–34)
MCHC RBC-ENTMCNC: 33.7 G/DL — SIGNIFICANT CHANGE UP (ref 32–36)
MCV RBC AUTO: 85.9 FL — SIGNIFICANT CHANGE UP (ref 80–100)
NRBC # BLD: 0 /100 WBCS — SIGNIFICANT CHANGE UP (ref 0–0)
PLATELET # BLD AUTO: 420 K/UL — HIGH (ref 150–400)
POTASSIUM SERPL-MCNC: 3.8 MMOL/L — SIGNIFICANT CHANGE UP (ref 3.5–5.3)
POTASSIUM SERPL-SCNC: 3.8 MMOL/L — SIGNIFICANT CHANGE UP (ref 3.5–5.3)
PROT SERPL-MCNC: 8.2 G/DL — SIGNIFICANT CHANGE UP (ref 6–8.3)
RBC # BLD: 4.83 M/UL — SIGNIFICANT CHANGE UP (ref 3.8–5.2)
RBC # FLD: 13 % — SIGNIFICANT CHANGE UP (ref 10.3–14.5)
SODIUM SERPL-SCNC: 138 MMOL/L — SIGNIFICANT CHANGE UP (ref 135–145)
WBC # BLD: 8.01 K/UL — SIGNIFICANT CHANGE UP (ref 3.8–10.5)
WBC # FLD AUTO: 8.01 K/UL — SIGNIFICANT CHANGE UP (ref 3.8–10.5)

## 2024-12-04 PROCEDURE — 85027 COMPLETE CBC AUTOMATED: CPT

## 2024-12-04 PROCEDURE — 36415 COLL VENOUS BLD VENIPUNCTURE: CPT

## 2024-12-04 PROCEDURE — 93005 ELECTROCARDIOGRAM TRACING: CPT

## 2024-12-04 PROCEDURE — 86900 BLOOD TYPING SEROLOGIC ABO: CPT

## 2024-12-04 PROCEDURE — 86901 BLOOD TYPING SEROLOGIC RH(D): CPT

## 2024-12-04 PROCEDURE — 93010 ELECTROCARDIOGRAM REPORT: CPT

## 2024-12-04 PROCEDURE — 86850 RBC ANTIBODY SCREEN: CPT

## 2024-12-04 PROCEDURE — G0463: CPT

## 2024-12-04 PROCEDURE — 80053 COMPREHEN METABOLIC PANEL: CPT

## 2024-12-04 NOTE — H&P PST ADULT - NSICDXPASTSURGICALHX_GEN_ALL_CORE_FT
PAST SURGICAL HISTORY:  H/O bilateral breast reduction surgery 25 years ago    H/O shoulder surgery 2018    History of hip replacement, total, bilateral     S/P lumbar spinal fusion 9/9/2024 @ [place]

## 2024-12-04 NOTE — H&P PST ADULT - NSANTHOSAYNRD_GEN_A_CORE
Denies TERENCE/No. TERENCE screening performed.  STOP BANG Legend: 0-2 = LOW Risk; 3-4 = INTERMEDIATE Risk; 5-8 = HIGH Risk

## 2024-12-04 NOTE — H&P PST ADULT - NSICDXPASTMEDICALHX_GEN_ALL_CORE_FT
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

## 2024-12-04 NOTE — H&P PST ADULT - HISTORY OF PRESENT ILLNESS
70 yo female with Anxiety, presents to PST scheduled for a robot assist laparoscopic cholecystectomy on 12/13 with Dr. Harding  H/O RUQ pain. Hida scan showed GB EF 18%. Reports pain everyday. Rates her pain 3/10.

## 2024-12-04 NOTE — H&P PST ADULT - PROBLEM SELECTOR PLAN 2
Labs - CBC, CMP,   MC with PCP, to make an appt   Pre op and Hibiclens instructions reviewed and given. Take routine am Duloxetine, day of surgery with sip of water. Xanax as needed. Instructed to hold and/or avoid other NSAIDs and OTC supplements. Tylenol is ok. Verbalized understanding Labs - CBC, CMP, T&S and EKG  MC with PCP, to make an appt   Pre op and Hibiclens instructions reviewed and given. Take routine am Duloxetine, day of surgery with sip of water. Xanax as needed. Instructed to hold and/or avoid other NSAIDs and OTC supplements. Tylenol is ok. Verbalized understanding

## 2024-12-12 RX ORDER — 0.9 % SODIUM CHLORIDE 0.9 %
1000 INTRAVENOUS SOLUTION INTRAVENOUS
Refills: 0 | Status: DISCONTINUED | OUTPATIENT
Start: 2024-12-13 | End: 2024-12-13

## 2024-12-12 NOTE — ASU PATIENT PROFILE, ADULT - NSICDXPASTMEDICALHX_GEN_ALL_CORE_FT
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

## 2024-12-12 NOTE — ASU PATIENT PROFILE, ADULT - NSICDXPASTSURGICALHX_GEN_ALL_CORE_FT
PAST SURGICAL HISTORY:  H/O bilateral breast reduction surgery 25 years ago    H/O shoulder surgery 2018    History of hip replacement, total, bilateral     S/P lumbar spinal fusion 9/9/2024 @ [place]

## 2024-12-13 ENCOUNTER — OUTPATIENT (OUTPATIENT)
Dept: OUTPATIENT SERVICES | Facility: HOSPITAL | Age: 69
LOS: 1 days | End: 2024-12-13
Payer: MEDICARE

## 2024-12-13 ENCOUNTER — APPOINTMENT (OUTPATIENT)
Dept: SURGERY | Facility: HOSPITAL | Age: 69
End: 2024-12-13

## 2024-12-13 ENCOUNTER — TRANSCRIPTION ENCOUNTER (OUTPATIENT)
Age: 69
End: 2024-12-13

## 2024-12-13 ENCOUNTER — RESULT REVIEW (OUTPATIENT)
Age: 69
End: 2024-12-13

## 2024-12-13 VITALS
DIASTOLIC BLOOD PRESSURE: 70 MMHG | RESPIRATION RATE: 14 BRPM | SYSTOLIC BLOOD PRESSURE: 137 MMHG | TEMPERATURE: 98 F | OXYGEN SATURATION: 98 % | HEART RATE: 56 BPM

## 2024-12-13 VITALS
HEART RATE: 57 BPM | OXYGEN SATURATION: 99 % | DIASTOLIC BLOOD PRESSURE: 58 MMHG | TEMPERATURE: 98 F | RESPIRATION RATE: 15 BRPM | SYSTOLIC BLOOD PRESSURE: 107 MMHG

## 2024-12-13 DIAGNOSIS — Z96.643 PRESENCE OF ARTIFICIAL HIP JOINT, BILATERAL: Chronic | ICD-10-CM

## 2024-12-13 DIAGNOSIS — Z98.890 OTHER SPECIFIED POSTPROCEDURAL STATES: Chronic | ICD-10-CM

## 2024-12-13 DIAGNOSIS — K82.8 OTHER SPECIFIED DISEASES OF GALLBLADDER: ICD-10-CM

## 2024-12-13 DIAGNOSIS — Z98.1 ARTHRODESIS STATUS: Chronic | ICD-10-CM

## 2024-12-13 PROCEDURE — 88304 TISSUE EXAM BY PATHOLOGIST: CPT | Mod: 26

## 2024-12-13 PROCEDURE — S2900: CPT

## 2024-12-13 PROCEDURE — 47563 LAPARO CHOLECYSTECTOMY/GRAPH: CPT | Mod: 59

## 2024-12-13 PROCEDURE — C1889: CPT

## 2024-12-13 PROCEDURE — 47563 LAPARO CHOLECYSTECTOMY/GRAPH: CPT

## 2024-12-13 PROCEDURE — S2900 ROBOTIC SURGICAL SYSTEM: CPT | Mod: NC

## 2024-12-13 PROCEDURE — 88304 TISSUE EXAM BY PATHOLOGIST: CPT

## 2024-12-13 DEVICE — LIGATING CLIPS WECK HEMOLOK POLYMER MEDIUM-LARGE (GREEN) 6: Type: IMPLANTABLE DEVICE | Status: FUNCTIONAL

## 2024-12-13 RX ORDER — INDOCYANINE GREEN AND WATER 25 MG
5 KIT INJECTION ONCE
Refills: 0 | Status: COMPLETED | OUTPATIENT
Start: 2024-12-13 | End: 2024-12-13

## 2024-12-13 RX ORDER — ALPRAZOLAM 0.5 MG
1 TABLET ORAL
Refills: 0 | DISCHARGE

## 2024-12-13 RX ORDER — NALOXONE HCL 0.4 MG/ML
1 AMPUL (ML) INJECTION
Qty: 1 | Refills: 0
Start: 2024-12-13

## 2024-12-13 RX ORDER — DOCUSATE SODIUM 100 MG
1 CAPSULE ORAL
Qty: 6 | Refills: 0
Start: 2024-12-13 | End: 2024-12-14

## 2024-12-13 RX ORDER — HYDROMORPHONE HYDROCHLORIDE 2 MG/1
0.5 TABLET ORAL
Refills: 0 | Status: DISCONTINUED | OUTPATIENT
Start: 2024-12-13 | End: 2024-12-13

## 2024-12-13 RX ORDER — CEFAZOLIN SODIUM 10 G
2000 VIAL (EA) INJECTION ONCE
Refills: 0 | Status: COMPLETED | OUTPATIENT
Start: 2024-12-13 | End: 2024-12-13

## 2024-12-13 RX ORDER — ONDANSETRON HYDROCHLORIDE 4 MG/1
4 TABLET, FILM COATED ORAL ONCE
Refills: 0 | Status: DISCONTINUED | OUTPATIENT
Start: 2024-12-13 | End: 2024-12-13

## 2024-12-13 RX ORDER — OXYCODONE HYDROCHLORIDE 30 MG/1
1 TABLET ORAL
Qty: 8 | Refills: 0
Start: 2024-12-13

## 2024-12-13 RX ORDER — DULOXETINE HCL 60 MG
1 CAPSULE,DELAYED RELEASE (ENTERIC COATED) ORAL
Refills: 0 | DISCHARGE

## 2024-12-13 RX ORDER — 0.9 % SODIUM CHLORIDE 0.9 %
1000 INTRAVENOUS SOLUTION INTRAVENOUS
Refills: 0 | Status: DISCONTINUED | OUTPATIENT
Start: 2024-12-13 | End: 2024-12-13

## 2024-12-13 RX ADMIN — INDOCYANINE GREEN AND WATER 5 MILLIGRAM(S): KIT at 10:39

## 2024-12-13 RX ADMIN — Medication 75 MILLILITER(S): at 10:18

## 2024-12-13 RX ADMIN — HYDROMORPHONE HYDROCHLORIDE 0.5 MILLIGRAM(S): 2 TABLET ORAL at 13:00

## 2024-12-13 RX ADMIN — Medication 75 MILLILITER(S): at 12:43

## 2024-12-13 RX ADMIN — HYDROMORPHONE HYDROCHLORIDE 0.5 MILLIGRAM(S): 2 TABLET ORAL at 13:15

## 2024-12-13 NOTE — ASU DISCHARGE PLAN (ADULT/PEDIATRIC) - FINANCIAL ASSISTANCE
[facility] provides services at a reduced cost to those who are determined to be eligible through [facility]’s financial assistance program. Information regarding [facility]’s financial assistance program can be found by going to [email or web address] or by calling [phone].

## 2024-12-13 NOTE — BRIEF OPERATIVE NOTE - NSICDXBRIEFPROCEDURE_GEN_ALL_CORE_FT
PROCEDURES:  Robot-assisted cholecystectomy with cholangiography 13-Dec-2024 12:28:57  Janet Magana

## 2024-12-13 NOTE — ASU DISCHARGE PLAN (ADULT/PEDIATRIC) - ASU DC SPECIAL INSTRUCTIONSFT
You may shower in 48 hours.  Do not remove wound glue.  Do not let water hit wounds directly, do not rub incisions.      No heavy lifting (nothing heavier than 5 lbs.), no strenuous physical activity, no exercise.      Do not drive for 24 hours after anesthesia.  Do not drive while taking narcotic pain medications.  Do not drive until pain-free.      You may perform your usual daily tasks as tolerated.      For pain after surgery, take the followin. 600mg prescription ibuprofen - take 1 pill every 6 hours with food regularly for the first 3 days after surgery, then as needed  2. If you still have pain despite the ibuprofen, then take the oxycodone as prescribed.  Take colace as prescribed while taking narcotic pain medication to prevent constipation.      Follow-up with Dr. Harding in his office next week - call office for appointment if not already made.

## 2024-12-13 NOTE — ASU DISCHARGE PLAN (ADULT/PEDIATRIC) - CARE PROVIDER_API CALL
Emmanuel Harding  Surgery  [address] [place] [address], [address]  [place] [address]  Phone: [phone]  Fax: [phone]  Follow Up Time: 1 week

## 2024-12-16 ENCOUNTER — APPOINTMENT (OUTPATIENT)
Dept: SURGERY | Facility: CLINIC | Age: 69
End: 2024-12-16
Payer: MEDICARE

## 2024-12-16 PROBLEM — Z90.49 S/P LAPAROSCOPIC CHOLECYSTECTOMY: Status: ACTIVE | Noted: 2024-12-16

## 2024-12-16 PROBLEM — I72.2 ANEURYSM OF RENAL ARTERY: Chronic | Status: ACTIVE | Noted: 2024-12-04

## 2024-12-16 PROCEDURE — 99024 POSTOP FOLLOW-UP VISIT: CPT

## 2024-12-17 LAB — SURGICAL PATHOLOGY STUDY: SIGNIFICANT CHANGE UP

## 2024-12-23 ENCOUNTER — APPOINTMENT (OUTPATIENT)
Dept: SURGERY | Facility: CLINIC | Age: 69
End: 2024-12-23
Payer: MEDICARE

## 2024-12-23 DIAGNOSIS — Z90.49 ACQUIRED ABSENCE OF OTHER SPECIFIED PARTS OF DIGESTIVE TRACT: ICD-10-CM

## 2024-12-23 PROCEDURE — 99024 POSTOP FOLLOW-UP VISIT: CPT

## 2025-01-27 ENCOUNTER — APPOINTMENT (OUTPATIENT)
Dept: SURGERY | Facility: CLINIC | Age: 70
End: 2025-01-27
Payer: MEDICARE

## 2025-01-27 PROCEDURE — 99024 POSTOP FOLLOW-UP VISIT: CPT

## 2025-02-03 ENCOUNTER — APPOINTMENT (OUTPATIENT)
Dept: SURGERY | Facility: CLINIC | Age: 70
End: 2025-02-03
Payer: MEDICARE

## 2025-02-03 DIAGNOSIS — Z90.49 ACQUIRED ABSENCE OF OTHER SPECIFIED PARTS OF DIGESTIVE TRACT: ICD-10-CM

## 2025-02-03 DIAGNOSIS — R10.11 RIGHT UPPER QUADRANT PAIN: ICD-10-CM

## 2025-02-03 PROCEDURE — 99024 POSTOP FOLLOW-UP VISIT: CPT

## 2025-04-14 ENCOUNTER — APPOINTMENT (OUTPATIENT)
Dept: SURGERY | Facility: CLINIC | Age: 70
End: 2025-04-14

## 2025-04-21 ENCOUNTER — APPOINTMENT (OUTPATIENT)
Dept: SURGERY | Facility: CLINIC | Age: 70
End: 2025-04-21
Payer: MEDICARE

## 2025-04-21 DIAGNOSIS — R10.11 RIGHT UPPER QUADRANT PAIN: ICD-10-CM

## 2025-04-21 PROCEDURE — 99213 OFFICE O/P EST LOW 20 MIN: CPT

## (undated) DEVICE — DRSG CURITY GAUZE SPONGE 4 X 4" 12-PLY

## (undated) DEVICE — TUBING STRYKER PNEUMOCLEAR HIGH FLOW

## (undated) DEVICE — XI ENDOWRIST SUCTION IRRIGATOR 8MM

## (undated) DEVICE — ELCTR BOVIE TIP BLADE INSULATED 2.75" EDGE

## (undated) DEVICE — PRESSURE INFUSOR BAG 1000ML

## (undated) DEVICE — WARMING BLANKET UPPER ADULT

## (undated) DEVICE — XI ENDOWRIST 12 - 8 MM CANNULA REDUCER

## (undated) DEVICE — SUT POLYSORB 0 30" GU-46

## (undated) DEVICE — LIGASURE MARYLAND 5MM X 37CM

## (undated) DEVICE — DRAPE 3/4 SHEET W REINFORCEMENT 56X77"

## (undated) DEVICE — XI OBTURATOR OPTICAL BLADELESS 8MM

## (undated) DEVICE — NDL HYPO SAFE 25G X 1.5" (ORANGE)

## (undated) DEVICE — SOL IRR POUR NS 0.9% 1000ML

## (undated) DEVICE — SUT MONOCRYL 4-0 27" PS-2 UNDYED

## (undated) DEVICE — GLV 8 PROTEXIS (WHITE)

## (undated) DEVICE — SOL IRR POUR H2O 1000ML

## (undated) DEVICE — SUT POLYSORB 2-0 30" V-20 UNDYED

## (undated) DEVICE — D HELP - CLEARVIEW CLEARIFY SYSTEM

## (undated) DEVICE — PLV-SCD MACHINE: Type: DURABLE MEDICAL EQUIPMENT

## (undated) DEVICE — XI 12MM AND STAPLER CANNULA SEAL

## (undated) DEVICE — XI CORD MONOPOLAR CAUTERY (GREEN)

## (undated) DEVICE — DRSG OPSITE 2.5 X 2"

## (undated) DEVICE — VENODYNE/SCD SLEEVE CALF MEDIUM

## (undated) DEVICE — SOL IRR BAG NS 0.9% 1000ML

## (undated) DEVICE — VENODYNE/SCD SLEEVE CALF LARGE

## (undated) DEVICE — DRSG MASTISOL

## (undated) DEVICE — DRAPE TOWEL BLUE 17" X 24"

## (undated) DEVICE — PACK GENERAL LAPAROSCOPY

## (undated) DEVICE — XI DRAPE COLUMN

## (undated) DEVICE — BLADE SCALPEL SAFETYLOCK #15

## (undated) DEVICE — XI CANNULA SEAL 5MM - 8 MM

## (undated) DEVICE — ENDOCATCH 10MM

## (undated) DEVICE — XI DRAPE ARM

## (undated) DEVICE — SMOKE EVACUTATION SYS LAPROSCOPIC AC/PA

## (undated) DEVICE — XI CORD BIPOLAR CAUTERY (BLUE)